# Patient Record
Sex: MALE | Race: WHITE | HISPANIC OR LATINO | Employment: FULL TIME | URBAN - METROPOLITAN AREA
[De-identification: names, ages, dates, MRNs, and addresses within clinical notes are randomized per-mention and may not be internally consistent; named-entity substitution may affect disease eponyms.]

---

## 2017-05-15 ENCOUNTER — OCCUPATIONAL MEDICINE (OUTPATIENT)
Dept: URGENT CARE | Facility: CLINIC | Age: 32
End: 2017-05-15
Payer: COMMERCIAL

## 2017-05-15 ENCOUNTER — APPOINTMENT (OUTPATIENT)
Dept: RADIOLOGY | Facility: IMAGING CENTER | Age: 32
End: 2017-05-15
Attending: PHYSICIAN ASSISTANT
Payer: COMMERCIAL

## 2017-05-15 VITALS
OXYGEN SATURATION: 96 % | BODY MASS INDEX: 32.97 KG/M2 | HEIGHT: 69 IN | RESPIRATION RATE: 16 BRPM | SYSTOLIC BLOOD PRESSURE: 150 MMHG | DIASTOLIC BLOOD PRESSURE: 90 MMHG | HEART RATE: 74 BPM | TEMPERATURE: 97.4 F | WEIGHT: 222.6 LBS

## 2017-05-15 DIAGNOSIS — M54.5 ACUTE LOW BACK PAIN, UNSPECIFIED BACK PAIN LATERALITY, WITH SCIATICA PRESENCE UNSPECIFIED: ICD-10-CM

## 2017-05-15 DIAGNOSIS — Z02.1 PRE-EMPLOYMENT DRUG SCREENING: ICD-10-CM

## 2017-05-15 LAB
AMP AMPHETAMINE: NORMAL
BREATH ALCOHOL COMMENT: NEGATIVE
COC COCAINE: NORMAL
INT CON NEG: NORMAL
INT CON POS: NORMAL
MET METHAMPHETAMINES: NORMAL
OPI OPIATES: NORMAL
PCP PHENCYCLIDINE: NORMAL
POC BREATHALIZER: 0 PERCENT (ref 0–0.01)
POC DRUG COMMENT 753798-OCCUPATIONAL HEALTH: NEGATIVE
THC: NORMAL

## 2017-05-15 PROCEDURE — 72100 X-RAY EXAM L-S SPINE 2/3 VWS: CPT | Mod: TC | Performed by: PHYSICIAN ASSISTANT

## 2017-05-15 PROCEDURE — 80305 DRUG TEST PRSMV DIR OPT OBS: CPT | Performed by: PHYSICIAN ASSISTANT

## 2017-05-15 PROCEDURE — 99204 OFFICE O/P NEW MOD 45 MIN: CPT | Performed by: PHYSICIAN ASSISTANT

## 2017-05-15 PROCEDURE — 82075 ASSAY OF BREATH ETHANOL: CPT | Performed by: PHYSICIAN ASSISTANT

## 2017-05-15 RX ORDER — CYCLOBENZAPRINE HCL 10 MG
TABLET ORAL
Qty: 14 TAB | Refills: 0 | Status: SHIPPED | OUTPATIENT
Start: 2017-05-15 | End: 2018-09-14

## 2017-05-15 RX ORDER — KETOROLAC TROMETHAMINE 30 MG/ML
60 INJECTION, SOLUTION INTRAMUSCULAR; INTRAVENOUS ONCE
Status: COMPLETED | OUTPATIENT
Start: 2017-05-15 | End: 2017-05-15

## 2017-05-15 RX ORDER — METHYLPREDNISOLONE 4 MG/1
TABLET ORAL
Qty: 21 TAB | Refills: 0 | Status: SHIPPED | OUTPATIENT
Start: 2017-05-15 | End: 2018-09-14

## 2017-05-15 RX ADMIN — KETOROLAC TROMETHAMINE 60 MG: 30 INJECTION, SOLUTION INTRAMUSCULAR; INTRAVENOUS at 10:00

## 2017-05-15 ASSESSMENT — ENCOUNTER SYMPTOMS
BRUISES/BLEEDS EASILY: 0
VOMITING: 0
NECK PAIN: 0
RESPIRATORY NEGATIVE: 1
NEUROLOGICAL NEGATIVE: 1
MYALGIAS: 1
BACK PAIN: 1
CARDIOVASCULAR NEGATIVE: 1
NAUSEA: 0
CONSTITUTIONAL NEGATIVE: 1

## 2017-05-15 NOTE — Clinical Note
[facility]   [address] [place] [address]  Phone: [phone] - Fax: [phone]        [facility] Progress Report and Disability Certification  Date of Service: 5/15/2017   No Show:  No  Date / Time of Next Visit: 5/19/2017   Claim Information   Patient Name: Lee Steinberg  Claim Number:     Employer:   [employer] Date of Injury: 5/15/2017     Insurer / TPA: Arsenio Antunez  ID / SSN:     Occupation: [place]  Diagnosis: Diagnoses of Acute low back pain, unspecified back pain laterality, with sciatica presence unspecified and Pre-employment drug screening were pertinent to this visit.    Medical Information   Related to Industrial Injury? No  Comments:Indeterminant.  Lower back pain has been going on for last few months and had it this morning prior to incident    Subjective Complaints:  DOI:  05/15/17, 3143.   Patient states he was placing paper along baseboards.  He was on all fours and as he was working by the door someone opened it and hit in the head causing him to get knocked backwards.  He was wearing safety helmet.  He fell back onto directly onto his back.   He denies LOC but felt a little dizzy for the first few seconds which has completely resovled.  Pain in the lower back started about 10 mins after the incident.  He is having bilateral lower back pain that is taking his breath away and cannot sit well.    The pain is radiating to both of his legs a bit and up the back a little.  No numbness in legs or weakness focally.  No changes in bladder or bowel function.  Continues to deny head pain.  No vision changes.  No dizziness or weakness.  Admits he has some lower back pain in same area this morning when he woke up.  No second job or outside contributing activities.    Objective Findings: Vitals reviewed.   Neuro/Head: Normocephalic, atraumatic.  A&O x 3.  CN 2-12 grossly intact.  Motor strength upper and lower full and  intact bilaterally.  Speech normal/clear.  Normal coordination. Antalgic gait  Back: on inspection there is no bruising, ecchymosis or deformity.  No midline TTP.  Significant bilateral paraspinous TTP mid-lower lumbar region with spasms.  Decreased ROM of back by 75% secondary to pain. Bilateral lower extremities with 5/5 strength, normal sensation throughout and 2+ DTRs.        Pre-Existing Condition(s): Lower back pain over last several months.   Upper back injury, fall,    Assessment:   Initial Visit    Status: Additional Care Required  Permanent Disability:No    Plan:      Diagnostics: X-ray    Comments:  Impression       Unremarkable complete lumbar spine series.       Reading Provider Reading Date    Ricky Cruz M.D. May 15, 2017        Disability Information   Status: Released to Restricted Duty    From:  5/15/2017  Through: 2017 Restrictions are:     Physical Restrictions   Sitting:    Standing:  < or = to 4 hrs/day Stooping:    Bendin hrs/day   Squatting:    Walking:  < or = to 4 hrs/day Climbin hrs/day Pushing:      Pulling:    Other:    Reaching Above Shoulder (L):   Reaching Above Shoulder (R):       Reaching Below Shoulder (L):    Reaching Below Shoulder (R):      Not to exceed Weight Limits   Carrying(hrs):   Weight Limit(lb): < or = to 10 pounds Lifting(hrs):   Weight  Limit(lb): < or = to 10 pounds   Comments: -patient in significant pain in clinic.  Restrictions as above.   -Toradol shot given in clinic, patient tolerated well.  Monitored for 15 mins s/p shot.   -back care discussed at home, restrictions at work apply to home.   -recommend heat/ice prn.  Gentle stretches daily.   -due to severity of inflammation/pain Medrol pack given.   -Flexeril if needed for bedtime/spasms. No driving, caution drowsy and not to be taken while at work.   -back pain red flags and ER precautions discussed with patient.   - 3 day follow up.   Sooner if needed.      -Case discussed with   Terell Johns.       Repetitive Actions   Hands: i.e. Fine Manipulations from Grasping:     Feet: i.e. Operating Foot Controls:     Driving / Operate Machinery:     Physician Name: Keyana Alexis PA-C Physician Signature: KEYANA May PA-C e-Signature: Dr. Samuel Barreto, Medical Director   Clinic Name / Location: [address], [address]  [place] [address] Clinic Phone Number: Dept: [phone]   Appointment Time: 8:45 Am Visit Start Time: 9:25 AM   Check-In Time:  8:56 Am Visit Discharge Time: 10:27 AM   Original-Treating Physician or Chiropractor    Page 2-Insurer/TPA    Page 3-Employer    Page 4-Employee

## 2017-05-15 NOTE — PROGRESS NOTES
Subjective:      Lee Steinberg is a 31 y.o. male who presents with Back Pain      DOI:  05/15/17, 7169.   Patient states he was placing paper along baseboards.  He was on all fours and as he was working by the door someone opened it and hit in the head causing him to get knocked backwards.  He was wearing safety helmet.  He fell back onto directly onto his back.   He denies LOC but felt a little dizzy for the first few seconds which has completely resovled.  Pain in the lower back started about 10 mins after the incident.  He is having bilateral lower back pain that is taking his breath away and cannot sit well.    The pain is radiating to both of his legs a bit and up the back a little.  No numbness in legs or weakness focally.  No changes in bladder or bowel function.  Continues to deny head pain.  No vision changes.  No dizziness or weakness.  Admits he has some lower back pain in same area this morning when he woke up.  No second job or outside contributing activities.    As noted on D 39 patient has had low back pain for last 1-2 months without inciting injury.     Back Pain    as above.     Review of Systems   Constitutional: Negative.    HENT: Negative.    Respiratory: Negative.    Cardiovascular: Negative.    Gastrointestinal: Negative for nausea and vomiting.   Musculoskeletal: Positive for myalgias and back pain. Negative for neck pain.   Skin: Negative.    Neurological: Negative.    Endo/Heme/Allergies: Does not bruise/bleed easily.   All other systems reviewed and are negative.       PMH:  has no past medical history on file.  MEDS:   Current outpatient prescriptions:   •  cyclobenzaprine (FLEXERIL) 10 MG Tab, 1/2 to 1 tablet at bedtime prn back pain/spasms, Disp: 14 Tab, Rfl: 0  •  MethylPREDNISolone (MEDROL DOSEPAK) 4 MG Tablet Therapy Pack, Take as directed, Disp: 21 Tab, Rfl: 0  ALLERGIES: No Known Allergies  SURGHX: History reviewed. No pertinent past surgical history.  SOCHX:  Never smoker.   FH:  "Family history was reviewed, no pertinent findings to report     Objective:     /90 mmHg  Pulse 74  Temp(Src) 36.3 °C (97.4 °F)  Resp 16  Ht 1.753 m (5' 9\")  Wt 100.971 kg (222 lb 9.6 oz)  BMI 32.86 kg/m2  SpO2 96%     Physical Exam   Constitutional: He is oriented to person, place, and time. He appears well-developed and well-nourished. Distressed: uncomfortable, standing.    Cardiovascular: Normal rate.    Pulmonary/Chest: Effort normal.   Neurological: He is alert and oriented to person, place, and time.   Skin: Skin is warm and dry. No rash noted.   Psychiatric: He has a normal mood and affect. His behavior is normal.     Vitals reviewed.   Neuro/Head: Normocephalic, atraumatic.  A&O x 3.  CN 2-12 grossly intact.  Motor strength upper and lower full and intact bilaterally.  Speech normal/clear.  Normal coordination. Antalgic gait  Back: on inspection there is no bruising, ecchymosis or deformity.  No midline TTP.  Significant bilateral paraspinous TTP mid-lower lumbar region with spasms.  Decreased ROM of back by 75% secondary to pain. Bilateral lower extremities with 5/5 strength, normal sensation throughout and 2+ DTRs.          Assessment/Plan:     1. Acute low back pain, unspecified back pain laterality, with sciatica presence unspecified  ketorolac (TORADOL) injection (60 mg/2mL vial)    DX-LUMBAR SPINE-2 OR 3 VIEWS    cyclobenzaprine (FLEXERIL) 10 MG Tab    MethylPREDNISolone (MEDROL DOSEPAK) 4 MG Tablet Therapy Pack   2. Pre-employment drug screening  POCT Breath Alcohol Test    POCT 6 Panel Urine Drug Screen         -Patient in significant pain in clinic.  Restrictions as above.   -Toradol shot given in clinic, patient tolerated well.  Monitored for 15 mins s/p shot.   -back care discussed at home, restrictions at work apply to home.   -recommend heat/ice prn.  Gentle stretches daily.   -due to severity of inflammation/pain Medrol pack given.   -Flexeril if needed for bedtime/spasms. No " driving, caution drowsy and not to be taken while at work.   -back pain red flags and ER precautions discussed with patient.   - 3 day follow up.   Sooner if needed.      -Case discussed with Dr. Terell Johns.       Juana Alexis PA-C

## 2017-05-15 NOTE — Clinical Note
"EMPLOYEE’S CLAIM FOR COMPENSATION/ REPORT OF INITIAL TREATMENT  FORM C-4    EMPLOYEE’S CLAIM - PROVIDE ALL INFORMATION REQUESTED   First Name  Lee Last Name  Idris Birthdate                    1985                Sex  male Claim Number   Home Address  [address] Age  31 y.o. Height  1.753 m (5' 9\") Weight  100.971 kg (222 lb 9.6 oz) [facility]     [facility] Zip  [address] Telephone  [phone] (home)    Mailing Address  [address] [facility] Zip  [address] Primary Language Spoken  English    Insurer  Arsenio Antunez Third Party   Arsenio Antunez   Employee's Occupation (Job Title) When Injury or Occupational Disease Occurred  [employer]    Employer's Name    [employer] Telephone  [phone]    Employer Address  [address]  Zip  [address]   Date of Injury  5/15/2017               Hour of Injury  8:00 AM Date Employer Notified  5/15/2017 Last Day of Work after Injury or Occupational Disease  5/15/2017 Supervisor to Whom Injury Reported  Gerald   Address or Location of Accident (if applicable)  [[address]   What were you doing at the time of accident? (if applicable)  Masking a door    How did this injury or occupational disease occur? (Be specific an answer in detail. Use additional sheet if necessary)  I was on my knees masking a door and a ganesh opened a   door and hit me in the head and I fell backwards   If you believe that you have an occupational disease, when did you first have knowledge of the disability and it relationship to your employment?  N/A Witnesses to the Accident  Luke      Nature of Injury or Occupational Disease  Workers' Compensation  Part(s) of Body Injured or Affected  Lower Back Area (Lumbar Area & Lumbo-Sacral), ,     I certify that the above is true and correct to the best of my " knowledge and that I have provided this information in order to obtain the benefits of [place]  I hereby authorize any physician, chiropractor, surgeon, practitioner, or other person, any hospital, including [facility] or [facility], any medical service organization, any insurance company, or other institution or organization to release to each other, any medical or other information, including benefits paid or payable, pertinent to this injury or disease, except information relative to diagnosis, treatment and/or counseling for AIDS, psychological conditions, alcohol or controlled substances, for which I must give specific authorization.  A Photostat of this authorization shall be as valid as the original.     Date 05/15/2017   [facility]   Employee’s Signature   THIS REPORT MUST BE COMPLETED AND MAILED WITHIN 3 WORKING DAYS OF TREATMENT   Place  [facility]  Name of Facility  [facility]   Date  5/15/2017 Diagnosis  (M54.5) Acute low back pain, unspecified back pain laterality, with sciatica presence unspecified  (Z02.1) Pre-employment drug screening Is there evidence the injured employee was under the influence of alcohol and/or another controlled substance at the time of accident?   Hour  9:25 AM Description of Injury or Disease  Diagnoses of Acute low back pain, unspecified back pain laterality, with sciatica presence unspecified and Pre-employment drug screening were pertinent to this visit. No   Treatment  -patient in significant pain in clinic.  Restrictions as above.   -Toradol shot given in clinic, patient tolerated well.  Monitored for 15 mins s/p shot.   -back care discussed at home, restrictions at work apply to home.   -recommend heat/ice prn.  Gentle stretches daily.   -due to severity of inflammation/pain Medrol pack given.   -Flexeril if  needed for bedtime/spasms. No driving, caution drowsy and not to be taken while at work.   -back pain red flags and ER precautions discussed with patient.   - 3 day follow up.   Sooner if needed.      -Case discussed with Dr. Terell Johns.     Have you advised the patient to remain off work five days or more? No   X-Ray Findings  Negative   If Yes   From Date  To Date      From information given by the employee, together with medical evidence, can you directly connect this injury or occupational disease as job incurred?  No  Comments:see D-39 If No Full Duty  No Modified Duty  Yes   Is additional medical care by a physician indicated?  Yes If Modified Duty, Specify any Limitations / Restrictions  SEE D-39   Do you know of any previous injury or disease contributing to this condition or occupational disease?                            Yes  Comments:1-2 months of lower back pain, non-work related.  No injury outside of work.    Date  5/15/2017 Print Doctor’s Name Juana Alexis PA-C I certify the employer’s copy of  this form was mailed on:   Address  [address] [facility] [address] Insurer’s Use Only     [facility] Zip  [address]    Provider’s Tax ID Number  [id number]  Telephone  Dept: [phone]        e-JUANA Mcintyre PA-C   e-Signature: Dr. Samuel Barreto, Medical Director Degree  MEREDITH        ORIGINAL-TREATING PHYSICIAN OR CHIROPRACTOR    PAGE 2-INSURER/TPA    PAGE 3-EMPLOYER    PAGE 4-EMPLOYEE             Form C-4 (rev10/07)              BRIEF DESCRIPTION OF RIGHTS AND BENEFITS  (Pursuant to [place]RS 616C.050)    Notice of Injury or Occupational Disease (Incident Report Form C-1): If an injury or occupational disease (OD) arises out of and in the  course of employment, you must provide written notice to your employer as soon as practicable, but no later than 7 days after the accident or  OD. Your employer shall maintain a sufficient supply of the required forms.    Claim for Compensation (Form  "C-4): If medical treatment is sought, the form C-4 is available at the place of initial treatment. A completed  \"Claim for Compensation\" (Form C-4) must be filed within 90 days after an accident or OD. The treating physician or chiropractor must,  within 3 working days after treatment, complete and mail to the employer, the employer's insurer and third-party , the Claim for  Compensation.    Medical Treatment: If you require medical treatment for your on-the-job injury or OD, you may be required to select a physician or  chiropractor from a list provided by your workers’ compensation insurer, if it has contracted with an Organization for Managed Care (MCO) or  Preferred Provider Organization (PPO) or providers of health care. If your employer has not entered into a contract with an MCO or PPO, you  may select a physician or chiropractor from the Panel of Physicians and Chiropractors. Any medical costs related to your industrial injury or  OD will be paid by your insurer.    Temporary Total Disability (TTD): If your doctor has certified that you are unable to work for a period of at least 5 consecutive days, or 5  cumulative days in a 20-day period, or places restrictions on you that your employer does not accommodate, you may be entitled to TTD  compensation.    Temporary Partial Disability (TPD): If the wage you receive upon reemployment is less than the compensation for TTD to which you are  entitled, the insurer may be required to pay you TPD compensation to make up the difference. TPD can only be paid for a maximum of 24  months.    Permanent Partial Disability (PPD): When your medical condition is stable and there is an indication of a PPD as a result of your injury or  OD, within 30 days, your insurer must arrange for an evaluation by a rating physician or chiropractor to determine the degree of your PPD. The  amount of your PPD award depends on the date of injury, the results of the PPD " evaluation and your age and wage.    Permanent Total Disability (PTD): If you are medically certified by a treating physician or chiropractor as permanently and totally disabled  and have been granted a PTD status by your insurer, you are entitled to receive monthly benefits not to exceed 66 2/3% of your average  monthly wage. The amount of your PTD payments is subject to reduction if you previously received a PPD award.    Vocational Rehabilitation Services: You may be eligible for vocational rehabilitation services if you are unable to return to the job due to a  permanent physical impairment or permanent restrictions as a result of your injury or occupational disease.    Transportation and Per Sariah Reimbursement: You may be eligible for travel expenses and per sariah associated with medical treatment.    Reopening: You may be able to reopen your claim if your condition worsens after claim closure.    Appeal Process: If you disagree with a written determination issued by the insurer or the insurer does not respond to your request, you may  appeal to the Department of Administration, , by following the instructions contained in your determination letter. You must  appeal the determination within 70 days from the date of the determination letter at [address], [address], [place]  [address], or [address] [facility] [address], [place] [address]. If you disagree with the  decision, you may appeal to the  Department of Administration, . You must file your appeal within 30 days from the date of the  decision  letter at [address], [address], [place] [address], or [address] [facility] [facility] [address], [place] [address]. If you  disagree with a decision of an , you may file a petition for judicial review with the District Court. You must do so within 30  days of the Appeal Officer’s decision. You may be  represented by an  at your own expense or you may contact the NA for possible  representation.    [place]  for Injured Workers (NAIW): If you disagree with a  decision, you may request that NAIW represent you  without charge at an  Hearing. For information regarding denial of benefits, you may contact the [facility] at: [address], [address], [place] [address], [phone], or [address], [address], [place] [address], [phone]    To File a Complaint with the Division: If you wish to file a complaint with the  of the Division of Industrial Relations (DIR),  please contact the Workers’ Compensation Section, [address], [address], [place], [place] [address], telephone (767) [phone], or  [address], [address], [place], [place] [address], telephone ([phone]) [phone].    For assistance with Workers’ Compensation Issues: you may contact the Office of the Governor Consumer Health Assistance, [address], [address], [place], [place] [address], Toll Free 1-906.544.6486, Web site: http://govcha.[facility].nv.us, E-mail  Sirisha@[email or web address].[email or web address].                                                                                                                                                                                                                                   __________________________________________________________________                                                                   ___05/15/2017______________                Employee Name / Signature                                                                                                                                                       Date                                                                                                                                                                                                      D-2 (rev. 10/07)

## 2017-05-15 NOTE — MR AVS SNAPSHOT
"        Lee Steinberg   5/15/2017 8:45 AM   Occupational Medicine   MRN: [id number]    Department:  [facility]   Dept Phone:  [phone]    Description:  Male : 1985   Provider:  Juana Alexis PA-C           Reason for Visit     Back Pain back pain after being struck by a door this am      Allergies as of 5/15/2017     No Known Allergies      You were diagnosed with     Acute low back pain, unspecified back pain laterality, with sciatica presence unspecified   [[id number]]       Pre-employment drug screening   [[id number]]         Vital Signs     Blood Pressure Pulse Temperature Respirations Height Weight    150/90 mmHg 74 36.3 °C (97.4 °F) 16 1.753 m (5' 9\") 100.971 kg (222 lb 9.6 oz)    Body Mass Index Oxygen Saturation                32.86 kg/m2 96%          Basic Information     Date Of Birth Sex Race Ethnicity Preferred Language    1985 Male White  Origin (Urdu,Burundian,Togolese,Kazakh, etc) English      Health Maintenance     Patient has no pending health maintenance at this time      Results     POCT Breath Alcohol Test      Component Value Standard Range & Units    POC Breathalizer 0.000 0.00 - 0.01 Percent    Breath Alcohol Comment negative                 POCT 6 Panel Urine Drug Screen      Component    AMPHETAMINE    POC THC    COCAINE    OPIATES    PHENCYCLIDINE    METHAMPHETAMINES    POC Urine Drug Screen Comment    negative    Internal Control Positive    Valid    Internal Control Negative    Valid                        Current Immunizations     No immunizations on file.      Below and/or attached are the medications your provider expects you to take. Review all of your home medications and newly ordered medications with your provider and/or pharmacist. Follow medication instructions as directed by your provider and/or pharmacist. Please keep your medication list with you and share with your provider. Update the information when medications are discontinued, doses are " changed, or new medications (including over-the-counter products) are added; and carry medication information at all times in the event of emergency situations     Allergies:  No Known Allergies          Medications  Valid as of: May 15, 2017 - 10:29 AM    Generic Name Brand Name Tablet Size Instructions for use    Cyclobenzaprine HCl (Tab) FLEXERIL 10 MG 1/2 to 1 tablet at bedtime prn back pain/spasms        MethylPREDNISolone (Tablet Therapy Pack) MEDROL DOSEPAK 4 MG Take as directed        .                 Medicines prescribed today were sent to:     None      Medication refill instructions:       If your prescription bottle indicates you have medication refills left, it is not necessary to call your provider’s office. Please contact your pharmacy and they will refill your medication.    If your prescription bottle indicates you do not have any refills left, you may request refills at any time through one of the following ways: The online [employer] system (except Urgent Care), by calling your provider’s office, or by asking your pharmacy to contact your provider’s office with a refill request. Medication refills are processed only during regular business hours and may not be available until the next business day. Your provider may request additional information or to have a follow-up visit with you prior to refilling your medication.   *Please Note: Medication refills are assigned a new Rx number when refilled electronically. Your pharmacy may indicate that no refills were authorized even though a new prescription for the same medication is available at the pharmacy. Please request the medicine by name with the pharmacy before contacting your provider for a refill.        Your To Do List     Future Labs/Procedures Complete By Expires    DX-LUMBAR SPINE-2 OR 3 VIEWS  As directed 5/15/2018         [employer] Access Code: [email or web address]  Expires: 6/14/2017 10:29 AM    Your email address is not on file at [facility].  Email Addresses are required for you to sign up for Sumpto, please contact [phone] to verify your personal information and to provide your email address prior to attempting to register for Sumpto.    Lee Steinberg  [address]   [place] [address]    IntegriChaint  A secure, online tool to manage your health information     [employer]’s Sumpto® is a secure, online tool that connects you to your personalized health information from the privacy of your home -- day or night - making it very easy for you to manage your healthcare. Once the activation process is completed, you can even access your medical information using the Sumpto willie, which is available for free in the Apple Willie store or Google Play store.     To learn more about Sumpto, visit [email or web address]    There are two levels of access available (as shown below):   My Chart Features  [facility] Doctor [facility]  Specialists [facility]  Urgent  Care [facility]   Email your healthcare team securely and privately 24/7 X X X    Manage appointments: schedule your next appointment; view details of past/upcoming appointments X      Request prescription refills. X      View recent personal medical records, including lab and immunizations X X X X   View health record, including health history, allergies, medications X X X X   Read reports about your outpatient visits, procedures, consult and ER notes X X X X   See your discharge summary, which is a recap of your hospital and/or ER visit that includes your diagnosis, lab results, and care plan X X  X     How to register for Sumpto:  Once your e-mail address has been verified, follow the following steps to sign up for Sumpto.     1. Go to  [email or web address]  2. Click on the Sign Up Now box, which takes you to the New Member Sign Up page. You will need to provide the following information:  a. Enter your Sumpto Access Code exactly as it appears at the top  of this page. (You will not need to use this code after you’ve completed the sign-up process. If you do not sign up before the expiration date, you must request a new code.)   b. Enter your date of birth.   c. Enter your home email address.   d. Click Submit, and follow the next screen’s instructions.  3. Create a Ap[employer]ix ID. This will be your Ap[employer]ix login ID and cannot be changed, so think of one that is secure and easy to remember.  4. Create a Ap[employer]ix password. You can change your password at any time.  5. Enter your Password Reset Question and Answer. This can be used at a later time if you forget your password.   6. Enter your e-mail address. This allows you to receive e-mail notifications when new information is available in Apogenix.  7. Click Sign Up. You can now view your health information.    For assistance activating your [employer] account, call [phone]

## 2017-05-19 ENCOUNTER — OCCUPATIONAL MEDICINE (OUTPATIENT)
Dept: URGENT CARE | Facility: CLINIC | Age: 32
End: 2017-05-19
Payer: COMMERCIAL

## 2017-05-19 VITALS
DIASTOLIC BLOOD PRESSURE: 102 MMHG | OXYGEN SATURATION: 96 % | HEART RATE: 92 BPM | SYSTOLIC BLOOD PRESSURE: 128 MMHG | BODY MASS INDEX: 33.06 KG/M2 | TEMPERATURE: 97.3 F | WEIGHT: 224 LBS | RESPIRATION RATE: 12 BRPM

## 2017-05-19 DIAGNOSIS — S39.012D LOW BACK STRAIN, SUBSEQUENT ENCOUNTER: ICD-10-CM

## 2017-05-19 PROCEDURE — 99213 OFFICE O/P EST LOW 20 MIN: CPT | Performed by: NURSE PRACTITIONER

## 2017-05-19 ASSESSMENT — PAIN SCALES - GENERAL: PAINLEVEL: 8=MODERATE-SEVERE PAIN

## 2017-05-19 NOTE — MR AVS SNAPSHOT
Lee Steinberg   2017 9:00 AM   Occupational Medicine   MRN: [id number]    Department:  [facility]   Dept Phone:  [phone]    Description:  Male : 1985   Provider:  DC Warren           Reason for Visit     Follow-Up back      Allergies as of 2017     No Known Allergies      You were diagnosed with     Low back strain, subsequent encounter   [[id number]]         Vital Signs     Blood Pressure Pulse Temperature Respirations Weight Oxygen Saturation    128/102 mmHg 92 36.3 °C (97.3 °F) 12 101.606 kg (224 lb) 96%    Smoking Status                   Never Smoker            Basic Information     Date Of Birth Sex Race Ethnicity Preferred Language    1985 Male White  Origin (Albanian,Angolan,Costa Rican,Timbo, etc) English      Health Maintenance        Date Due Completion Dates    IMM DTaP/Tdap/Td Vaccine (1 - Tdap) 2004 ---            Current Immunizations     No immunizations on file.      Below and/or attached are the medications your provider expects you to take. Review all of your home medications and newly ordered medications with your provider and/or pharmacist. Follow medication instructions as directed by your provider and/or pharmacist. Please keep your medication list with you and share with your provider. Update the information when medications are discontinued, doses are changed, or new medications (including over-the-counter products) are added; and carry medication information at all times in the event of emergency situations     Allergies:  No Known Allergies          Medications  Valid as of: May 19, 2017 -  9:46 AM    Generic Name Brand Name Tablet Size Instructions for use    Cyclobenzaprine HCl (Tab) FLEXERIL 10 MG 1/2 to 1 tablet at bedtime prn back pain/spasms        MethylPREDNISolone (Tablet Therapy Pack) MEDROL DOSEPAK 4 MG Take as directed        .                 Medicines prescribed today were sent to:     [id number] -  [place], [place] - [address] [facility] [address]    [address] [facility] [place] [address]    Phone: [phone] Fax: [phone]    Open 24 Hours?: No      Medication refill instructions:       If your prescription bottle indicates you have medication refills left, it is not necessary to call your provider’s office. Please contact your pharmacy and they will refill your medication.    If your prescription bottle indicates you do not have any refills left, you may request refills at any time through one of the following ways: The online [employer] system (except Urgent Care), by calling your provider’s office, or by asking your pharmacy to contact your provider’s office with a refill request. Medication refills are processed only during regular business hours and may not be available until the next business day. Your provider may request additional information or to have a follow-up visit with you prior to refilling your medication.   *Please Note: Medication refills are assigned a new Rx number when refilled electronically. Your pharmacy may indicate that no refills were authorized even though a new prescription for the same medication is available at the pharmacy. Please request the medicine by name with the pharmacy before contacting your provider for a refill.           [employer] Access Code: [email or web address]  Expires: 6/14/2017 10:29 AM    Your email address is not on file at [facility].  Email Addresses are required for you to sign up for [employer], please contact [phone] to verify your personal information and to provide your email address prior to attempting to register for [employer].    Lee Steinberg  [address]   [place] [address]    [employer]  A secure, online tool to manage your health information     [facility]’s [employer]® is a secure, online tool that connects you to your personalized health information from the privacy of your home -- day or night - making it very easy for you to  manage your healthcare. Once the activation process is completed, you can even access your medical information using the [employer] willie, which is available for free in the Apple Willie store or Google Play store.     To learn more about [employer], visit [email or web address]    There are two levels of access available (as shown below):   My Chart Features  [facility]  [facility]  Urgent  Care Non-[facility] Primary [facility] Doctor   Email your healthcare team securely and privately 24/7 X X X    Manage appointments: schedule your next appointment; view details of past/upcoming appointments X      Request prescription refills. X      View recent personal medical records, including lab and immunizations X X X X   View health record, including health history, allergies, medications X X X X   Read reports about your outpatient visits, procedures, consult and ER notes X X X X   See your discharge summary, which is a recap of your hospital and/or ER visit that includes your diagnosis, lab results, and care plan X X  X     How to register for [employer]:  Once your e-mail address has been verified, follow the following steps to sign up for [employer].     1. Go to  [email or web address]  2. Click on the Sign Up Now box, which takes you to the New Member Sign Up page. You will need to provide the following information:  a. Enter your [employer] Access Code exactly as it appears at the top of this page. (You will not need to use this code after you’ve completed the sign-up process. If you do not sign up before the expiration date, you must request a new code.)   b. Enter your date of birth.   c. Enter your home email address.   d. Click Submit, and follow the next screen’s instructions.  3. Create a [employer] ID. This will be your [employer] login ID and cannot be changed, so think of one that is secure and easy to remember.  4. Create a [employer] password. You can change your password at any time.  5. Enter your  Password Reset Question and Answer. This can be used at a later time if you forget your password.   6. Enter your e-mail address. This allows you to receive e-mail notifications when new information is available in [employer].  7. Click Sign Up. You can now view your health information.    For assistance activating your [employer] account, call [phone]

## 2017-05-19 NOTE — Clinical Note
[facility]   [address] [place] [address]  Phone: [phone] - Fax: [phone]        [facility] Progress Report and Disability Certification  Date of Service: 5/19/2017   No Show:  No  Date / Time of Next Visit: 5/19/2017   Claim Information   Patient Name: Lee Steinberg  Claim Number:     Employer:   [employer] Date of Injury: 5/15/2017     Insurer / TPA: Arsenio Antunez  ID / SSN:     Occupation: [employer]  Diagnosis: The encounter diagnosis was Low back strain, subsequent encounter.    Medical Information   Related to Industrial Injury? Yes    Subjective Complaints:  DOI:  05/15/17, 6509.   Patient states he was placing paper along baseboards.  He was on all fours and as he was working by the door someone opened it and hit in the head causing him to get knocked backwards.  He was wearing safety helmet.  He fell back onto directly onto his back.   He denies LOC but felt a little dizzy for the first few seconds which has completely resovled.  Pain in the lower back started about 10 mins after the incident.  He is having bilateral lower back pain that is taking his breath away and cannot sit well. Today he continues to display discomfort and is standing as this is preferred. Nothing makes pain better. Is on medrol and flexeril (bedtime). He is not doing ice or heat or exercises rx'd on last visit. Leaving for [place] tomorrow, not returning.As noted on D 39 patient has had low back pain for last 1-2 months without inciting injury.    Objective Findings: Physical Exam   Constitutional: He appears well-developed and well-nourished. No distress.   Cardiovascular: Normal rate, regular rhythm and normal heart sounds.    No murmur heard.  Pulmonary/Chest: Effort normal and breath sounds normal.   Musculoskeletal:        Lumbar back: He exhibits decreased range of motion, tenderness, pain and spasm. He exhibits no  swelling.   He has 5/5 strength of legs in all areas of ROM.   Neurological: He is alert. No sensory deficit. He exhibits normal muscle tone. Gait normal.   Reflex Scores:       Patellar reflexes are 2+ on the right side and 2+ on the left side.  Nursing note and vitals reviewed.     Pre-Existing Condition(s): Lower back pain over last several months per previous note.   Assessment:   Condition Same    Status: Additional Care Required  Permanent Disability:No    Plan:      Diagnostics: X-ray    Comments:  Negative film.    Disability Information   Status: Released to Restricted Duty    From:  2017  Through: 2017 Restrictions are: Temporary   Physical Restrictions   Sitting:    Standing:  < or = to 4 hrs/day Stoopin hrs/day Bendin hrs/day   Squattin hrs/day Walking:  < or = to 4 hrs/day Climbin hrs/day Pushing:      Pulling:    Other:    Reaching Above Shoulder (L):   Reaching Above Shoulder (R):       Reaching Below Shoulder (L):    Reaching Below Shoulder (R):      Not to exceed Weight Limits   Carrying(hrs):   Weight Limit(lb): < or = to 10 pounds Lifting(hrs):   Weight  Limit(lb): < or = to 10 pounds   Comments:      Repetitive Actions   Hands: i.e. Fine Manipulations from Grasping:     Feet: i.e. Operating Foot Controls:     Driving / Operate Machinery:     Physician Name: DC Warren Physician Signature: XIAO Olivo e-Signature: Dr. Samuel Barreto, Medical Director   Clinic Name / Location: [address], [address]  [place] [address] Clinic Phone Number: Dept: [phone]   Appointment Time: 9:00 Am Visit Start Time: 9:00 AM   Check-In Time:  8:55 Am Visit Discharge Time:  9:36AM   Original-Treating Physician or Chiropractor    Page 2-Insurer/TPA    Page 3-Employer    Page 4-Employee

## 2017-05-19 NOTE — PROGRESS NOTES
Subjective:      Lee Steinberg is a 31 y.o. male who presents with Follow-Up            HPI DOI:  05/15/17, 6555.   Patient states he was placing paper along baseboards.  He was on all fours and as he was working by the door someone opened it and hit in the head causing him to get knocked backwards.  He was wearing safety helmet.  He fell back onto directly onto his back.   He denies LOC but felt a little dizzy for the first few seconds which has completely resovled.  Pain in the lower back started about 10 mins after the incident.  He is having bilateral lower back pain that is taking his breath away and cannot sit well. Today he continues to display discomfort and is standing as this is preferred. Nothing makes pain better. Is on medrol and flexeril (bedtime). He is not doing ice or heat or exercises rx'd on last visit. Leaving for [place] tomorrow, not returning.As noted on D 39 patient has had low back pain for last 1-2 months without inciting injury.   ROS    Please see HPI     Objective:     /102 mmHg  Pulse 92  Temp(Src) 36.3 °C (97.3 °F)  Resp 12  Wt 101.606 kg (224 lb)  SpO2 96%     Physical Exam   Constitutional: He appears well-developed and well-nourished. No distress.   Cardiovascular: Normal rate, regular rhythm and normal heart sounds.    No murmur heard.  Pulmonary/Chest: Effort normal and breath sounds normal.   Musculoskeletal:        Lumbar back: He exhibits decreased range of motion, tenderness, pain and spasm. He exhibits no swelling.   He has 5/5 strength of legs in all areas of ROM.   Neurological: He is alert. No sensory deficit. He exhibits normal muscle tone. Gait normal.   Reflex Scores:       Patellar reflexes are 2+ on the right side and 2+ on the left side.  Nursing note and vitals reviewed.              Assessment/Plan:     1. Low back strain, subsequent encounter       Restrictions per D39.  Message with Jose C lemus detailing encounter.  Follow up will have to be in [place] as  patient leaving company.  Stop medrol and start Ibuprofen 800 mg three times daily.  Ice/heat alteration.  Gentle exericses.

## 2018-09-14 ENCOUNTER — OCCUPATIONAL MEDICINE (OUTPATIENT)
Dept: URGENT CARE | Facility: CLINIC | Age: 33
End: 2018-09-14
Payer: COMMERCIAL

## 2018-09-14 ENCOUNTER — APPOINTMENT (OUTPATIENT)
Dept: RADIOLOGY | Facility: IMAGING CENTER | Age: 33
End: 2018-09-14
Attending: PHYSICIAN ASSISTANT
Payer: COMMERCIAL

## 2018-09-14 VITALS
TEMPERATURE: 98.5 F | OXYGEN SATURATION: 100 % | HEIGHT: 69 IN | WEIGHT: 230 LBS | RESPIRATION RATE: 18 BRPM | DIASTOLIC BLOOD PRESSURE: 82 MMHG | SYSTOLIC BLOOD PRESSURE: 134 MMHG | HEART RATE: 102 BPM | BODY MASS INDEX: 34.07 KG/M2

## 2018-09-14 DIAGNOSIS — S22.42XA CLOSED FRACTURE OF MULTIPLE RIBS OF LEFT SIDE, INITIAL ENCOUNTER: ICD-10-CM
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
## 2018-09-14 DIAGNOSIS — R07.81 RIB PAIN ON LEFT SIDE: ICD-10-CM

## 2018-09-14 LAB
AMP AMPHETAMINE: NORMAL
BAR BARBITURATES: NORMAL
BREATH ALCOHOL COMMENT: NORMAL
BZO BENZODIAZEPINES: NORMAL
COC COCAINE: NORMAL
INT CON NEG: NORMAL
INT CON POS: NORMAL
MDMA ECSTASY: NORMAL
MET METHAMPHETAMINES: NORMAL
MTD METHADONE: NORMAL
OPI OPIATES: NORMAL
OXY OXYCODONE: NORMAL
PCP PHENCYCLIDINE: NORMAL
POC BREATHALIZER: 0 PERCENT (ref 0–0.01)
POC URINE DRUG SCREEN OCDRS: NEGATIVE
THC: NORMAL

## 2018-09-14 PROCEDURE — 99214 OFFICE O/P EST MOD 30 MIN: CPT | Mod: 29 | Performed by: PHYSICIAN ASSISTANT

## 2018-09-14 PROCEDURE — 80305 DRUG TEST PRSMV DIR OPT OBS: CPT | Performed by: PHYSICIAN ASSISTANT

## 2018-09-14 PROCEDURE — 71101 X-RAY EXAM UNILAT RIBS/CHEST: CPT | Mod: TC,LT,29 | Performed by: PHYSICIAN ASSISTANT

## 2018-09-14 PROCEDURE — 82075 ASSAY OF BREATH ETHANOL: CPT | Performed by: PHYSICIAN ASSISTANT

## 2018-09-14 RX ORDER — HYDROCODONE BITARTRATE AND ACETAMINOPHEN 5; 325 MG/1; MG/1
TABLET ORAL
Qty: 21 TAB | Refills: 0 | Status: SHIPPED | OUTPATIENT
Start: 2018-09-14 | End: 2018-09-21

## 2018-09-14 ASSESSMENT — ENCOUNTER SYMPTOMS
NEUROLOGICAL NEGATIVE: 1
CARDIOVASCULAR NEGATIVE: 1
CONSTITUTIONAL NEGATIVE: 1
RESPIRATORY NEGATIVE: 1
GASTROINTESTINAL NEGATIVE: 1

## 2018-09-14 ASSESSMENT — PAIN SCALES - GENERAL: PAINLEVEL: 10=SEVERE PAIN

## 2018-09-14 NOTE — LETTER
"EMPLOYEE’S CLAIM FOR COMPENSATION/ REPORT OF INITIAL TREATMENT  FORM C-4    EMPLOYEE’S CLAIM - PROVIDE ALL INFORMATION REQUESTED   First Name  Lee Last Name  Idris Birthdate                    1985                Sex  male Claim Number   Home Address  [address] Age  33 y.o. Height  1.753 m (5' 9\") Weight  104.3 kg (230 lb) [facility] Zip  [address] Telephone  [phone] (home)    Mailing Address  [address] [facility] Zip  [address] Primary Language Spoken  English    Insurer  Arsenio Antunez Third Party   Arsenio Antunez   Employee's Occupation (Job Title) When Injury or Occupational Disease Occurred  [place]    Employer's Name    [employer]      Employer Address    City    State    Zip      Date of Injury  9/14/2018               Hour of Injury  10:50 AM Date Employer Notified  9/14/2018 Last Day of Work after Injury or Occupational Disease  9/14/2018 Supervisor to Whom Injury Reported  Gerald Hanson   Address or Location of Accident (if applicable)  [[address]]   What were you doing at the time of accident? (if applicable)  Coming Down Stairs    How did this injury or occupational disease occur? (Be specific an answer in detail. Use additional sheet if necessary)  I was going down some stairs for lunch and I slipt and fell on my rib area   If you believe that you have an occupational disease, when did you first have knowledge of the disability and it relationship to your employment?  NA Witnesses to the Accident  None      Nature of Injury or Occupational Disease  Contusion  Part(s) of Body Injured or Affected  Chest, ,     I certify that the above is true and correct to the best of my knowledge and that I have provided this information in order to obtain the benefits of [place]’s Industrial Insurance and " Occup[place] inclusive [place]).  I hereby authorize any physician, chiropractor, surgeon, practitioner, or other person, any hospital, including [facility] or [facility], any medical service organization, any insurance company, or other institution or organization to release to each other, any medical or other information, including benefits paid or payable, pertinent to this injury or disease, except information relative to diagnosis, treatment and/or counseling for AIDS, psychological conditions, alcohol or controlled substances, for which I must give specific authorization.  A Photostat of this authorization shall be as valid as the original.     Date   Place   Employee’s Signature   THIS REPORT MUST BE COMPLETED AND MAILED WITHIN 3 WORKING DAYS OF TREATMENT   Place  [facility]  Name of Facility  [facility]   Date  9/14/2018 Diagnosis  (S22.42XA) Closed fracture of multiple ribs of left side, initial encounter  (W10.9XXA) Fall on stairs, initial encounter Is there evidence the injured employee was under the influence of alcohol and/or another controlled substance at the time of accident?   Hour  12:52 PM Description of Injury or Disease  Diagnoses of Closed fracture of multiple ribs of left side, initial encounter and Fall on stairs, initial encounter were pertinent to this visit. No   Treatment  10th and 11th rib fractures on the left per RAD  Rib fracture care discussed in detail.   Red flags discussed such as new/sudden SOB or increasing pain, cough/fevers.   Patient will be placed on light duty if position available with above restrictions initially.   Given Norco for pain due to fracture. Discussed this medication with patient.  Only to be taken for severe pain and/or bedtime if needed.   Not to be taken at work or prior to work or driving.   Due to expected healing time patient will be transferred to [facility] for follow  "up.     Have you advised the patient to remain off work five days or more? No   X-Ray Findings  Positive   If Yes   From Date  To Date      From information given by the employee, together with medical evidence, can you directly connect this injury or occupational disease as job incurred?    Comments:Indeterminant  If No Full Duty  No Modified Duty  Yes   Is additional medical care by a physician indicated?  Yes If Modified Duty, Specify any Limitations / Restrictions  SEE D-39   Do you know of any previous injury or disease contributing to this condition or occupational disease?                            No   Date  9/14/2018 Print Doctor’s Name Keyana Alexis P.A.-C. I certify the employer’s copy of  this form was mailed on:   Address  [address], [address] Insurer’s Use Only     [facility] Zip  [address]    Provider’s Tax ID Number  [id number] Telephone  Dept: [phone]        e-KEYANA Mcintyre P.A.-C.   e-Signature: Dr. Samuel Barreto, Medical Director Degree  [place]        ORIGINAL-TREATING PHYSICIAN OR CHIROPRACTOR    PAGE 2-INSURER/TPA    PAGE 3-EMPLOYER    PAGE 4-EMPLOYEE             Form C-4 (rev10/07)              BRIEF DESCRIPTION OF RIGHTS AND BENEFITS  (Pursuant to [place] 616C.050)    Notice of Injury or Occupational Disease (Incident Report Form C-1): If an injury or occupational disease (OD) arises out of and in the  course of employment, you must provide written notice to your employer as soon as practicable, but no later than 7 days after the accident or  OD. Your employer shall maintain a sufficient supply of the required forms.    Claim for Compensation (Form C-4): If medical treatment is sought, the form C-4 is available at the place of initial treatment. A completed  \"Claim for Compensation\" (Form C-4) must be filed within 90 days after an accident or OD. The treating physician or chiropractor must,  within 3 working days after treatment, complete and mail to the " employer, the employer's insurer and third-party , the Claim for  Compensation.    Medical Treatment: If you require medical treatment for your on-the-job injury or OD, you may be required to select a physician or  chiropractor from a list provided by your workers’ compensation insurer, if it has contracted with an Organization for Managed Care (MCO) or  Preferred Provider Organization (PPO) or providers of health care. If your employer has not entered into a contract with an MCO or PPO, you  may select a physician or chiropractor from the Panel of Physicians and Chiropractors. Any medical costs related to your industrial injury or  OD will be paid by your insurer.    Temporary Total Disability (TTD): If your doctor has certified that you are unable to work for a period of at least 5 consecutive days, or 5  cumulative days in a 20-day period, or places restrictions on you that your employer does not accommodate, you may be entitled to TTD  compensation.    Temporary Partial Disability (TPD): If the wage you receive upon reemployment is less than the compensation for TTD to which you are  entitled, the insurer may be required to pay you TPD compensation to make up the difference. TPD can only be paid for a maximum of 24  months.    Permanent Partial Disability (PPD): When your medical condition is stable and there is an indication of a PPD as a result of your injury or  OD, within 30 days, your insurer must arrange for an evaluation by a rating physician or chiropractor to determine the degree of your PPD. The  amount of your PPD award depends on the date of injury, the results of the PPD evaluation and your age and wage.    Permanent Total Disability (PTD): If you are medically certified by a treating physician or chiropractor as permanently and totally disabled  and have been granted a PTD status by your insurer, you are entitled to receive monthly benefits not to exceed 66 2/3% of your  average  monthly wage. The amount of your PTD payments is subject to reduction if you previously received a PPD award.    Vocational Rehabilitation Services: You may be eligible for vocational rehabilitation services if you are unable to return to the job due to a  permanent physical impairment or permanent restrictions as a result of your injury or occupational disease.    Transportation and Per Sariah Reimbursement: You may be eligible for travel expenses and per sariah associated with medical treatment.    Reopening: You may be able to reopen your claim if your condition worsens after claim closure.    Appeal Process: If you disagree with a written determination issued by the insurer or the insurer does not respond to your request, you may  appeal to the Department of Administration, , by following the instructions contained in your determination letter. You must  appeal the determination within 70 days from the date of the determination letter at [address], [address], [place], [place]  [address], or [address], [address], [place] [address]. If you disagree with the  decision, you may appeal to the  Department of Administration, . You must file your appeal within 30 days from the date of the  decision  letter at [address], [address], [place], [place] [address], or [address], [address], [place] [address]. If you  disagree with a decision of an , you may file a petition for judicial review with the District Court. You must do so within 30  days of the Appeal Officer’s decision. You may be represented by an  at your own expense or you may contact the [facility] for possible  representation.    [place]  for Injured Workers (NAIW): If you disagree with a  decision, you may request that NAIW represent you  without charge at an  Hearing. For information  regarding denial of benefits, you may contact the [facility] at: [address], [address], [place] [address], [phone], or [address], [address], [place] [address], [phone]    To File a Complaint with the Division: If you wish to file a complaint with the  of the Division of Industrial Relations (DIR),  please contact the Workers’ Compensation Section, [address], [address], [place], [place] [address], telephone [phone]) [phone], or  [address], [address], [place], [place] [address], telephone [phone] [phone].    For assistance with Workers’ Compensation Issues: you may contact the Office of the Governor Consumer Health Assistance, [address] [place] [address], [address], [place], [place] [address], Toll Free 1-733.175.3820, Web site: http://govcha.[facility].nv.us, E-mail  [email or web address].                                                                                                                                                                                                                                   __________________________________________________________________                                                                   _________________                Employee Name / Signature                                                                                                                                                       Date                                                                                                                                                                                                     D-2 (rev. 10/07)

## 2018-09-14 NOTE — PROGRESS NOTES
"Subjective:      Lee Steinberg is a 33 y.o. male who presents with Rib Injury      DOI: 09/14/18, 1050.  Patient states he was going down some stairs on his way to lunch and he slipped, fell down, landing primarily on his left side/rib area.  The steps were natacha but denies any specific hazard or spill that he felt caused him to slip.  He notes he was walking at a normal pace, was carrying a bucket in on hand and some cords in the other.  He notes he had immediate pain in the area of the ribs that hit the edge of the stair with his full weight.   He has pain with most movements if his torso.  No numbness, tingling or weakness in extremities.   He has not attempted any interventions thus far.  No hx of broken ribs.      Rib Injury     as above.     Review of Systems   Constitutional: Negative.    HENT: Negative.    Respiratory: Negative.         SEE HPI   Cardiovascular: Negative.    Gastrointestinal: Negative.    Musculoskeletal:        SEE HPI   Skin: Negative.    Neurological: Negative.    Endo/Heme/Allergies: Negative.        PMH:  has no past medical history on file.  MEDS:   Current Outpatient Prescriptions:   •  HYDROcodone-acetaminophen (NORCO) 5-325 MG Tab per tablet, 1 tablet up to every 8 hours prn severe pain for up to 7 days.   No driving., Disp: 21 Tab, Rfl: 0  ALLERGIES: No Known Allergies  SURGHX: No past surgical history on file.  SOCHX:  reports that he has never smoked. He does not have any smokeless tobacco history on file.  FH: Family history was reviewed, no pertinent findings to report   Objective:     /82   Pulse (!) 102   Temp 36.9 °C (98.5 °F)   Resp 18   Ht 1.753 m (5' 9\")   Wt 104.3 kg (230 lb)   SpO2 100%   BMI 33.97 kg/m²      Physical Exam   Constitutional: He is oriented to person, place, and time. He appears well-developed and well-nourished. No distress.   HENT:   Head: Normocephalic and atraumatic.   Eyes: Conjunctivae and EOM are normal.   Neck: Normal range of motion. " Neck supple.   Neurological: He is alert and oriented to person, place, and time.   Skin: Skin is warm and dry.   Psychiatric: He has a normal mood and affect. His behavior is normal.     Vitals reviewed.   Patient appears uncomfortable, wincing with most movements of his torso.   Chest wall:  Left posterolateral inferior ribs with focal/significant TTP.  No ecchymosis.  Slight erythema to overlying skin. Reduced ROM of torso by 75% due to pain.   Card: RRR/No MRG  Lungs: Painful deep breaths due to rib pain.   Lungs CTA B/L     RAD    Impression       1.  Minimally displaced posterior lateral LEFT 10th and 11th rib fractures.  2.  No pleural fluid or pneumothorax.   Reading Provider Reading Date   Keon Morrison M.D. Sep 14, 2018       Assessment/Plan:     1. Closed fracture of multiple ribs of left side, initial encounter  [id number] (WITH 1-VIEW CXR) LEFT    HYDROcodone-acetaminophen (NORCO) 5-325 MG Tab per tablet    CONSENT FOR OPIATE PRESCRIPTION    10th and 11th.    2. Fall on stairs, initial encounter     3. Pre-employment drug screening  POCT Breath Alcohol Test    POCT 11 Panel Urine Drug Screen       10th and 11th rib fractures on the left per RAD  Rib fracture care discussed in detail.   Recommend icing 2-3 times daily for 20 mins initial several days.   Red flags discussed such as new/sudden SOB or increasing pain, cough/fevers.   Patient will be placed on light duty if position available with above restrictions initially.   Given Norco for pain due to fracture. Discussed this medication with patient. Only to be taken for severe pain and/or bedtime if needed.   Not to be taken at work or prior to work or driving.   Due to expected healing time patient will be transferred to [facility] for follow up.       Juana Alexis P.A.-C.

## 2018-09-14 NOTE — LETTER
[facility]  [address], [address] [place] [address]  Phone:  [phone] - Fax:  [phone]   [facility] Progress Report and Disability Certification  Date of Service: 9/14/2018   No Show:  No  Date / Time of Next Visit: 9/24/2018   Claim Information   Patient Name: Lee Steinberg  Claim Number:     Employer:   [employer] Date of Injury: 9/14/2018     Insurer / TPA: Arsenio Antunez  ID / SSN:     Occupation:   Diagnosis: Diagnoses of Closed fracture of multiple ribs of left side, initial encounter and Fall on stairs, initial encounter were pertinent to this visit.    Medical Information   Related to Industrial Injury?   Comments:indeterminant     Subjective Complaints:  DOI: 09/14/18, 1050.  Patient states he was going down some stairs on his way to lunch and he slipped, fell down, landing primarily on his left side/rib area.  The steps were natacha but denies any specific hazard or spill that he felt caused him to slip.  He notes he was walking at a normal pace, was carrying a bucket in on hand and some cords in the other.  He notes he had immediate pain in the area of the ribs that hit the edge of the stair with his full weight.   He has pain with most movements if his torso.  No numbness, tingling or weakness in extremities.   He has not attempted any interventions thus far.  No hx of broken ribs.    Objective Findings: Vitals reviewed.   Patient appears uncomfortable, wincing with most movements of his torso.   Chest wall:  Left posterolateral inferior ribs with focal/significant TTP.  No ecchymosis.  Slight erythema to overlying skin. Reduced ROM of torso by 75% due to pain.   Card: RRR/No MRG  Lungs: Painful deep breaths due to rib pain.   Lungs CTA B/L   Pre-Existing Condition(s):     Assessment:   Initial Visit    Status: Additional Care Required  Permanent Disability:No    Plan:      Diagnostics: X-ray    Comments:  Impression            1.  Minimally displaced posterior lateral LEFT 10th and 11th rib fractures.  2.  No pleural fluid or pneumothorax.  Reading Provider Reading Date  Keon Morrison M.D. Sep 14, 2018        Disability Information   Status: Released to Restricted Duty    From:  2018  Through: 2018 Restrictions are: Temporary   Physical Restrictions   Sitting:    Standing:    Stoopin hrs/day Bendin hrs/day   Squattin hrs/day Walking:    Climbing:  < or = to 1 hr/day  Comments:may ascend stairs to get to a station however no ladders or other equipment Pushin hrs/day   Pullin hrs/day Other:    Reaching Above Shoulder (L): 0 hrs/day Reaching Above Shoulder (R):       Reaching Below Shoulder (L):  0 hrs/day Reaching Below Shoulder (R):      Not to exceed Weight Limits   Carrying(hrs): 0 Weight Limit(lb):   Lifting(hrs): 0 Weight  Limit(lb):     Comments: 10th and 11th rib fractures on the left per RAD  Rib fracture care discussed in detail.   Recommend icing 2-3 times daily for 20 mins initial several days.   Red flags discussed such as new/sudden SOB or increasing pain, cough/fevers.   Patient will be placed on light duty if position available with above restrictions initially.   Given Norco for pain due to fracture. Discussed this medication with patient.  Only to be taken for severe pain and/or bedtime if needed.   Not to be taken at work or prior to work or driving.   Due to expected healing time patient will be transferred to [facility] for follow up.       Repetitive Actions   Hands: i.e. Fine Manipulations from Grasping:     Feet: i.e. Operating Foot Controls:     Driving / Operate Machinery:     Physician Name: Keyana Alexis P.A.-C. Physician Signature: KEYANA May P.A.-C. e-Signature: Dr. Samuel Barreto, Medical Director   Clinic Name / Location: [address], [address]  [place] [address] Clinic Phone Number: Dept: [phone]   Appointment  Time: 12:45 Pm Visit Start Time: 12:52 PM   Check-In Time:  12:41 Pm Visit Discharge Time: 1:49 PM   Original-Treating Physician or Chiropractor    Page 2-Insurer/TPA    Page 3-Employer    Page 4-Employee

## 2018-09-24 ENCOUNTER — OCCUPATIONAL MEDICINE (OUTPATIENT)
Dept: OCCUPATIONAL MEDICINE | Facility: CLINIC | Age: 33
End: 2018-09-24
Payer: COMMERCIAL

## 2018-09-24 VITALS
OXYGEN SATURATION: 96 % | BODY MASS INDEX: 35.61 KG/M2 | HEART RATE: 80 BPM | WEIGHT: 235 LBS | TEMPERATURE: 98.9 F | RESPIRATION RATE: 16 BRPM | SYSTOLIC BLOOD PRESSURE: 144 MMHG | HEIGHT: 68 IN | DIASTOLIC BLOOD PRESSURE: 82 MMHG

## 2018-09-24 DIAGNOSIS — S22.42XD CLOSED FRACTURE OF MULTIPLE RIBS OF LEFT SIDE WITH ROUTINE HEALING, SUBSEQUENT ENCOUNTER: ICD-10-CM

## 2018-09-24 PROCEDURE — 99204 OFFICE O/P NEW MOD 45 MIN: CPT | Performed by: PREVENTIVE MEDICINE

## 2018-09-24 RX ORDER — DICLOFENAC SODIUM 75 MG/1
75 TABLET, DELAYED RELEASE ORAL 2 TIMES DAILY
Qty: 60 TAB | Refills: 1 | Status: SHIPPED | OUTPATIENT
Start: 2018-09-24

## 2018-09-24 ASSESSMENT — PAIN SCALES - GENERAL: PAINLEVEL: 8=MODERATE-SEVERE PAIN

## 2018-09-24 NOTE — PROGRESS NOTES
"Subjective:      Lee Steinberg is a 33 y.o. male who presents with Other (WC FV DOI 9/14/18 (L) side rbis, same, rm 1)      Date of injury 9/14/2018.  Mechanism of injury- \"I was going down some stairs for lunch and slipped and fell on my rib area\".  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  He was seen in urgent care 1 week ago where x-rays showed rib fractures on the left numbers 10 and 11.  Overall, he seems to be doing well.  His prescription medication did not help him as much as Excedrin.  No shortness of breath.  No hemoptysis.  He is tolerating restricted work.     HPI    ROS  Comprehensive medical history form reviewed. Pertinent positives and negatives included in HPI.    PFSH: reviewed in Epic    PMH:  has no past medical history on file.  MEDS:   Current Outpatient Prescriptions:   •  diclofenac EC (VOLTAREN) 75 MG Tablet Delayed Response, Take 1 Tab by mouth 2 times a day., Disp: 60 Tab, Rfl: 1  ALLERGIES: No Known Allergies  SURGHX: History reviewed. No pertinent surgical history.  SOCHX:  reports that he has been smoking.  He has never used smokeless tobacco. He reports that he does not drink alcohol or use drugs.  Work Status: Works as A  for [employer]  FH: No pertinent hereditary disorders.        Objective:     /82   Pulse 80   Temp 37.2 °C (98.9 °F)   Resp 16   Ht 1.727 m (5' 8\")   Wt 106.6 kg (235 lb)   SpO2 96%   BMI 35.73 kg/m²      Physical Exam    Appearance: Well-developed, well-nourished.   Mental Status: Mood and Affect normal. Pleasant. Cooperative. Appropriate.   ENT: Oropharynx clear. Moist mucous membranes. Hearing normal.   Eyes: Pupils reactive. Conjunctiva normal. No scleral icterus.   Neck: Trachea Midline. No thyromegaly. No masses.  Cardiovascular: Normal rate. Regular rhythm. Normal heart sounds.   Chest: Effort normal. Breath sounds clear.   Skin: Skin is warm and dry. No rash.   Musculoskeletal: Left thoracic area shows " mild tenderness.         Assessment/Plan:     1. Closed fracture of multiple ribs of left side with routine healing, subsequent encounter  New to occupational health from urgent care  - diclofenac EC (VOLTAREN) 75 MG Tablet Delayed Response; Take 1 Tab by mouth 2 times a day.  Dispense: 60 Tab; Refill: 1  Acetaminophen  Restricted activity  Recheck in 2 weeks

## 2018-09-24 NOTE — LETTER
"[address],   [address] [place] [address]  Phone:  [phone] - Fax:  [phone]   [facility] Progress Report and Disability Certification  Date of Service: 9/24/2018   No Show:  No  Date / Time of Next Visit: 10/11/2018 @ 4:45pm   Claim Information   Patient Name: Lee Steinberg  Claim Number:     Employer:    Date of Injury: 9/14/2018     Insurer / TPA: Arsenio Antunez  ID / SSN:     Occupation: [employer]  Diagnosis: The encounter diagnosis was Closed fracture of multiple ribs of left side with routine healing, subsequent encounter.    Medical Information   Related to Industrial Injury? Yes    Subjective Complaints:  Date of injury 9/14/2018.  Mechanism of injury- \"I was going down some stairs for lunch and slipped and fell on my rib area\".  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  He was seen in urgent care 1 week ago where x-rays showed rib fractures on the left numbers 10 and 11.  Overall, he seems to be doing well.  His prescription medication did not help him as much as Excedrin.  No shortness of breath.  No hemoptysis.  He is tolerating restricted work.   Objective Findings: Appearance: Well-developed, well-nourished.   Mental Status: Mood and Affect normal. Pleasant. Cooperative. Appropriate.   ENT: Oropharynx clear. Moist mucous membranes. Hearing normal.   Eyes: Pupils reactive. Conjunctiva normal. No scleral icterus.   Neck: Trachea Midline. No thyromegaly. No masses.  Cardiovascular: Normal rate. Regular rhythm. Normal heart sounds.   Chest: Effort normal. Breath sounds clear.   Skin: Skin is warm and dry. No rash.   Musculoskeletal: Left thoracic area shows mild tenderness.     Pre-Existing Condition(s):     Assessment:   Condition Same    Status: Additional Care Required  Permanent Disability:No    Plan: Medication    Diagnostics:      Comments:       Disability Information   Status: Released to Restricted " Duty    From:  2018  Through: 10/11/2018 Restrictions are: Temporary   Physical Restrictions   Sitting:    Standing:    Stooping:    Bending:      Squatting:    Walking:    Climbin hrs/day Pushing:  < or = to 2 hrs/day   Pulling:  < or = to 2 hrs/day Other:    Reaching Above Shoulder (L):   Reaching Above Shoulder (R):       Reaching Below Shoulder (L):    Reaching Below Shoulder (R):      Not to exceed Weight Limits   Carrying(hrs):   Weight Limit(lb):   Lifting(hrs):   Weight  Limit(lb): < or = to 10 pounds   Comments:      Repetitive Actions   Hands: i.e. Fine Manipulations from Grasping:     Feet: i.e. Operating Foot Controls:     Driving / Operate Machinery:     Physician Name: Terell Johns M.D. Physician Signature: TERELL Simms M.D. e-Signature: Dr. Samuel Barreto, Medical Director   Clinic Name / Location: [address],   [address] [place] [address] Clinic Phone Number: Dept: [phone]   Appointment Time: 3:15 Pm Visit Start Time: 3:38 PM   Check-In Time:  3:24 Pm Visit Discharge Time:  4:13pm   Original-Treating Physician or Chiropractor    Page 2-Insurer/TPA    Page 3-Employer    Page 4-Employee

## 2018-10-11 ENCOUNTER — OCCUPATIONAL MEDICINE (OUTPATIENT)
Dept: OCCUPATIONAL MEDICINE | Facility: CLINIC | Age: 33
End: 2018-10-11
Payer: COMMERCIAL

## 2018-10-11 VITALS
DIASTOLIC BLOOD PRESSURE: 98 MMHG | HEART RATE: 86 BPM | OXYGEN SATURATION: 94 % | BODY MASS INDEX: 33.8 KG/M2 | WEIGHT: 223 LBS | HEIGHT: 68 IN | SYSTOLIC BLOOD PRESSURE: 122 MMHG | RESPIRATION RATE: 16 BRPM | TEMPERATURE: 97.6 F

## 2018-10-11 DIAGNOSIS — S22.42XD CLOSED FRACTURE OF MULTIPLE RIBS OF LEFT SIDE WITH ROUTINE HEALING: ICD-10-CM

## 2018-10-11 PROCEDURE — 99212 OFFICE O/P EST SF 10 MIN: CPT | Performed by: PREVENTIVE MEDICINE

## 2018-10-11 ASSESSMENT — PAIN SCALES - GENERAL: PAINLEVEL: 3=SLIGHT PAIN

## 2018-10-11 NOTE — PROGRESS NOTES
"Subjective:      Lee Steinberg is a 33 y.o. male who presents with Other (WC follow up - Left side ribs - DOI 9/14/2018 - Same - Room 17)      Date of injury 9/14/2018.  Mechanism of injury- \"I was going down some stairs for lunch and slipped and fell on my rib area\".  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  He indicates he is improved.  He is tolerating most of his regular job duties.  No longer taking any medication.     HPI    ROS  PFSH:  WORK STATUS: Restricted activity  PMH:  has no past medical history on file.  MEDS:   Current Outpatient Prescriptions:   •  diclofenac EC (VOLTAREN) 75 MG Tablet Delayed Response, Take 1 Tab by mouth 2 times a day., Disp: 60 Tab, Rfl: 1       Objective:     /98   Pulse 86   Temp 36.4 °C (97.6 °F)   Resp 16   Ht 1.727 m (5' 8\")   Wt 101.2 kg (223 lb)   SpO2 94%   BMI 33.91 kg/m²      Physical Exam    Appearance: Well-developed, well-nourished.   Mental Status: Mood and Affect normal. Pleasant. Cooperative. Appropriate.   Chest: Effort normal. Breath sounds clear.          Assessment/Plan:     1. Closed fracture of multiple ribs of left side with routine healing  Condition improved  Regular work  Released from care      "

## 2018-10-11 NOTE — LETTER
"[address],   [address] [place] [address]  Phone:  [phone] - Fax:  [phone]   [facility] Progress Report and Disability Certification  Date of Service: 10/11/2018   No Show:  No  Date / Time of Next Visit:  Discharged   Claim Information   Patient Name: Lee Steniberg  Claim Number:     Employer:   [employer]   Date of Injury: 9/14/2018     Insurer / TPA: Arsenio Antunez  ID / SSN:     Occupation:   Diagnosis: The encounter diagnosis was Closed fracture of multiple ribs of left side with routine healing.    Medical Information   Related to Industrial Injury? Yes    Subjective Complaints:  Date of injury 9/14/2018.  Mechanism of injury- \"I was going down some stairs for lunch and slipped and fell on my rib area\".  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  He indicates he is improved.  He is tolerating most of his regular job duties.  No longer taking any medication.   Objective Findings: Appearance: Well-developed, well-nourished.   Mental Status: Mood and Affect normal. Pleasant. Cooperative. Appropriate.   Chest: Effort normal. Breath sounds clear.      Pre-Existing Condition(s):     Assessment:   Condition Improved    Status: Discharged /  MMI  Permanent Disability:No    Plan:      Diagnostics:      Comments:       Disability Information   Status: Released to Full Duty    From:  10/11/2018  Through:   Restrictions are:     Physical Restrictions   Sitting:    Standing:    Stooping:    Bending:      Squatting:    Walking:    Climbing:    Pushing:      Pulling:    Other:    Reaching Above Shoulder (L):   Reaching Above Shoulder (R):       Reaching Below Shoulder (L):    Reaching Below Shoulder (R):      Not to exceed Weight Limits   Carrying(hrs):   Weight Limit(lb):   Lifting(hrs):   Weight  Limit(lb):     Comments: Activity as tolerated    Repetitive Actions   Hands: i.e. Fine Manipulations from " Grasping:     Feet: i.e. Operating Foot Controls:     Driving / Operate Machinery:     Physician Name: Terell Johns M.D. Physician Signature: TERELL Simms M.D. e-Signature: Dr. Samuel Barreto, Medical Director   Clinic Name / Location: [address],   [address]  [place] [address] Clinic Phone Number: Dept: [phone]   Appointment Time: 4:45 Pm Visit Start Time: 4:30 PM   Check-In Time:  4:29 Pm Visit Discharge Time:  04:48 PM   Original-Treating Physician or Chiropractor    Page 2-Insurer/TPA    Page 3-Employer    Page 4-Employee